# Patient Record
Sex: MALE
[De-identification: names, ages, dates, MRNs, and addresses within clinical notes are randomized per-mention and may not be internally consistent; named-entity substitution may affect disease eponyms.]

---

## 2022-08-10 ENCOUNTER — HOSPITAL ENCOUNTER (OUTPATIENT)
Dept: HOSPITAL 97 - ER | Age: 47
Setting detail: OBSERVATION
LOS: 1 days | Discharge: HOME | End: 2022-08-11
Attending: HOSPITALIST | Admitting: HOSPITALIST
Payer: COMMERCIAL

## 2022-08-10 VITALS — BODY MASS INDEX: 34.4 KG/M2

## 2022-08-10 DIAGNOSIS — E86.0: ICD-10-CM

## 2022-08-10 DIAGNOSIS — M19.90: ICD-10-CM

## 2022-08-10 DIAGNOSIS — R94.31: ICD-10-CM

## 2022-08-10 DIAGNOSIS — I95.1: Primary | ICD-10-CM
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
## 2022-08-10 DIAGNOSIS — N17.9: ICD-10-CM

## 2022-08-10 DIAGNOSIS — I10: ICD-10-CM

## 2022-08-10 DIAGNOSIS — Z82.49: ICD-10-CM

## 2022-08-10 DIAGNOSIS — Z79.899: ICD-10-CM

## 2022-08-10 DIAGNOSIS — Z20.822: ICD-10-CM

## 2022-08-10 LAB
ALBUMIN SERPL BCP-MCNC: 3.8 G/DL (ref 3.4–5)
ALP SERPL-CCNC: 90 U/L (ref 45–117)
ALT SERPL W P-5'-P-CCNC: 53 U/L (ref 12–78)
AST SERPL W P-5'-P-CCNC: 39 U/L (ref 15–37)
BUN BLD-MCNC: 23 MG/DL (ref 7–18)
GLUCOSE SERPLBLD-MCNC: 87 MG/DL (ref 74–106)
HCT VFR BLD CALC: 41.2 % (ref 39.6–49)
INR BLD: 0.97
LYMPHOCYTES # SPEC AUTO: 1.1 K/UL (ref 0.7–4.9)
MCV RBC: 84.7 FL (ref 80–100)
METHAMPHET UR QL SCN: NEGATIVE
PMV BLD: 7.6 FL (ref 7.6–11.3)
POTASSIUM SERPL-SCNC: 3.7 MMOL/L (ref 3.5–5.1)
RBC # BLD: 4.86 M/UL (ref 4.33–5.43)
THC SERPL-MCNC: NEGATIVE NG/ML
TROPONIN I SERPL HS-MCNC: 7.1 PG/ML (ref ?–58.9)

## 2022-08-10 PROCEDURE — 87811 SARS-COV-2 COVID19 W/OPTIC: CPT

## 2022-08-10 PROCEDURE — 85610 PROTHROMBIN TIME: CPT

## 2022-08-10 PROCEDURE — 84443 ASSAY THYROID STIM HORMONE: CPT

## 2022-08-10 PROCEDURE — 84484 ASSAY OF TROPONIN QUANT: CPT

## 2022-08-10 PROCEDURE — 80076 HEPATIC FUNCTION PANEL: CPT

## 2022-08-10 PROCEDURE — 93005 ELECTROCARDIOGRAM TRACING: CPT

## 2022-08-10 PROCEDURE — 80320 DRUG SCREEN QUANTALCOHOLS: CPT

## 2022-08-10 PROCEDURE — 85730 THROMBOPLASTIN TIME PARTIAL: CPT

## 2022-08-10 PROCEDURE — 81003 URINALYSIS AUTO W/O SCOPE: CPT

## 2022-08-10 PROCEDURE — 80329 ANALGESICS NON-OPIOID 1 OR 2: CPT

## 2022-08-10 PROCEDURE — 82550 ASSAY OF CK (CPK): CPT

## 2022-08-10 PROCEDURE — 85025 COMPLETE CBC W/AUTO DIFF WBC: CPT

## 2022-08-10 PROCEDURE — 84100 ASSAY OF PHOSPHORUS: CPT

## 2022-08-10 PROCEDURE — 80307 DRUG TEST PRSMV CHEM ANLYZR: CPT

## 2022-08-10 PROCEDURE — 93306 TTE W/DOPPLER COMPLETE: CPT

## 2022-08-10 PROCEDURE — 80061 LIPID PANEL: CPT

## 2022-08-10 PROCEDURE — 83735 ASSAY OF MAGNESIUM: CPT

## 2022-08-10 PROCEDURE — 36415 COLL VENOUS BLD VENIPUNCTURE: CPT

## 2022-08-10 PROCEDURE — 80048 BASIC METABOLIC PNL TOTAL CA: CPT

## 2022-08-10 RX ADMIN — Medication SCH ML: at 21:00

## 2022-08-10 NOTE — EDPHYS
Physician Documentation                                                                           

 [facility]                                                                 

Name: Juan Land                                                                              

Age: 47 yrs                                                                                       

Sex: Male                                                                                         

: 1975                                                                                   

MRN: [id number]                                                                                   

Arrival Date: 08/10/2022                                                                          

Time: 11:36                                                                                       

Account#: [id number]                                                                            

Bed 15                                                                                            

Private MD:                                                                                       

ED Physician Eamon Giraldo                                                                         

HPI:                                                                                              

08/10                                                                                             

11:45 This 47 yrs old  Male presents to ER via EMS with complaints of Heat Exposure.  cp  

11:45 The patient has experienced near-syncope, almost passed out, felt generally weak.       cp  

      Onset: The symptoms/episode began/occurred suddenly, just prior to arrival. Duration:       

      This was a single episode. Context: occurred outdoors, occurred while the patient was       

      working, Just prior to the episode the patient experienced no apparent symptoms.            

      Associated injury: The patient did not suffer any apparent associated injury.               

      Associated signs and symptoms: Pertinent negatives: abdominal pain, chest pain,             

      confusion, headache, palpitations, shortness of breath, vomiting. Current symptoms:         

      Currently, the patient is not experiencing any symptoms, the patient feels back to          

      baseline.                                                                                   

11:45 The patient has not experienced similar symptoms in the past. Patient reports he was    cp  

      outside working this morning for about 1 hour, when he suddenly felt lightheaded and        

      almost lost consciousness.                                                                  

                                                                                                  

Historical:                                                                                       

- Allergies:                                                                                      

11:50 No Known Allergies;                                                                     jg9 

- Home Meds:                                                                                      

11:50 phenazopyridine 200 mg Oral tab as needed [Active]; Toradol Oral 10 mg as needed        jg9 

      [Active];                                                                                   

- PMHx:                                                                                           

11:50 Arthritis;                                                                              jg9 

11:50 Kidney stones;                                                                          jg9 

                                                                                                  

- Immunization history:: Adult Immunizations not up to date.                                      

- Social history:: Smoking status: Patient denies any tobacco usage or history of.                

                                                                                                  

                                                                                                  

ROS:                                                                                              

11:50 Constitutional: Negative for body aches, chills, fever, poor PO intake.                 cp  

11:50 Eyes: Negative for injury, pain, redness, and discharge.                                cp  

11:50 ENT: Negative for drainage from ear(s), ear pain, sore throat, difficulty swallowing,       

      difficulty handling secretions.                                                             

11:50 Cardiovascular: Negative for chest pain, edema, palpitations.                               

11:50 Respiratory: Negative for cough, shortness of breath, wheezing.                             

11:50 Abdomen/GI: Negative for abdominal pain, nausea, vomiting, and diarrhea, constipation,      

      black/tarry stool, rectal bleeding.                                                         

11:50 Back: Negative for pain at rest, pain with movement.                                        

11:50 : Negative for urinary symptoms.                                                          

11:50 Neuro: Positive for near syncope, Negative for altered mental status, headache, loss of     

      consciousness, speech changes, syncope.                                                     

11:50 All other systems are negative.                                                             

                                                                                                  

Exam:                                                                                             

11:55 Constitutional: The patient appears in no acute distress, alert, awake,                 cp  

      non-diaphoretic, non-toxic, well developed, well nourished, overweight                      

11:55 Head/Face:  Normocephalic, atraumatic.                                                  cp  

11:55 Eyes: Periorbital structures: appear normal, Pupils: equal, round, and reactive to          

      light and accomodation, Extraocular movements: intact throughout, Conjunctiva: normal,      

      no exudate, no injection, Sclera: no appreciated abnormality, Lids and lashes: appear       

      normal, bilaterally.                                                                        

11:55 ENT: External ear(s): are unremarkable, Nose: is normal, Mouth: Lips: moist, Oral           

      mucosa: pink and intact, moist, Posterior pharynx: Airway: no evidence of obstruction,      

      patent, swelling, is not appreciated, erythema, is not appreciated, exudate, is not         

      appreciated.                                                                                

11:55 Neck: ROM/movement: is normal, is supple, without pain, no range of motions                 

      limitations, no nuchal rigidity.                                                            

11:55 Chest/axilla: Inspection: normal, Palpation: is normal, no crepitus, no tenderness.         

11:55 Cardiovascular: Rate: normal, Rhythm: regular, Heart sounds: murmur, not appreciated,       

      Edema: is not appreciated, JVD: is not appreciated.                                         

11:55 Respiratory: the patient does not display signs of respiratory distress,  Respirations:     

      normal, no use of accessory muscles, no retractions, labored breathing, is not present,     

      Breath sounds: are clear throughout, no decreased breath sounds, no stridor, no             

      wheezing.                                                                                   

11:55 Abdomen/GI: Inspection: abdomen appears normal, Bowel sounds: active, all quadrants,        

      Palpation: abdomen is soft and non-tender, in all quadrants.                                

11:55 Back: pain, is absent, ROM is normal.                                                       

11:55 Skin: cellulitis, is not appreciated, no rash present.                                      

11:55 Neuro: Orientation: to person, place \T\ time. Mentation: is normal, Cerebellar function:   

      Romberg testing is negative, Motor: moves all fours, strength is normal, Sensation: is      

      normal.                                                                                     

12:05 ECG was reviewed by the Attending Physician.                                            cp  

15:37 ECG was reviewed by the Attending Physician.                                            cp  

                                                                                                  

Vital Signs:                                                                                      

11:30  / 83; Pulse 75; Resp 16 S; Temp 97.7(A); Pulse Ox 100% on R/A; Weight 111.13 kg  j9 

      (R); Height 5 ft. 10 in. (177.80 cm) (R); Pain 0/10;                                        

12:00  / 76; Pulse 76; Resp 17; Pulse Ox 99% ;                                          jg9 

13:00  / 89; Pulse 66; Resp 14 S; Pulse Ox 100% on R/A; Pain 0/10;                      jg9 

14:00  / 88; Pulse 65; Resp 17 S; Pulse Ox 100% on R/A; Pain 0/10;                      jg9 

15:00  / 81; Pulse 71; Resp 15 S; Pulse Ox 100% on R/A;                                 jg9 

16:00  / 81; Pulse 65; Resp 15 S; Pulse Ox 100% on R/A;                                 jg9 

17:00  / 91; Pulse 68; Resp 14; Pulse Ox 95% on R/A;                                    jg9 

18:00  / 84; Pulse 68; Resp 13 S; Pulse Ox 100% ;                                       jg9 

19:00  / 77; Pulse 68; Resp 15; Pulse Ox 99% on R/A;                                    jg9 

11:30 Body Mass Index 35.15 (111.13 kg, 177.80 cm)                                            9 

                                                                                                  

MDM:                                                                                              

11:43 Patient medically screened.                                                             cp  

12:10 Differential Diagnosis: cardiac arrhythmia, cerebrovascular accident, GI bleed,         cp  

      idiopathic syncope, transient ischemic attack, vasovagal episode.                           

15:45 Physician consultation: Hai Simon MD was called at 15:45, regarding consult,        cp  

      patient's condition.                                                                        

16:15 Data reviewed: vital signs, nurses notes, lab test result(s), EKG, radiologic studies,  cp  

      plain films.                                                                                

16:15 Test interpretation: by ED physician or midlevel provider: ECG, plain radiologic        cp  

      studies. Counseling: I had a detailed discussion with the patient and/or guardian           

      regarding: the historical points, exam findings, and any diagnostic results supporting      

      the discharge/admit diagnosis, lab results, radiology results.                              

17:10 Physician consultation: Hai Simon MD was contacted at 17:07, regarding consult,     cp  

      patient's condition, recommends admission for observation and echo in morning.              

                                                                                                  

08/10                                                                                             

11:38 Order name: Acetaminophen; Complete Time: 13:27                                         cp  

08/10                                                                                             

11:38 Order name: Basic Metabolic Panel; Complete Time: 13:27                                 cp  

08/10                                                                                             

13:14 Interpretation: Normal except: BUN 23; CRE 1.50; GFR 57.                                cp  

08/10                                                                                             

11:38 Order name: CBC with Diff; Complete Time: 13:14                                         cp  

08/10                                                                                             

11:38 Order name: ETOH Level; Complete Time: 13:14                                            cp  

08/10                                                                                             

11:38 Order name: Hepatic Function; Complete Time: 13:27                                      cp  

08/10                                                                                             

13:27 Interpretation: Normal except: AST 39; GLOB 4.1; A/G 0.9.                               cp  

08/10                                                                                             

11:38 Order name: PT-INR; Complete Time: 13:14                                                cp  

08/10                                                                                             

11:38 Order name: Ptt, Activated; Complete Time: 13:14                                        cp  

08/10                                                                                             

11:38 Order name: Salicylate; Complete Time: 13:27                                            cp  

08/10                                                                                             

11:38 Order name: Urine Drug Screen; Complete Time: 13:27                                     cp  

08/10                                                                                             

11:38 Order name: CK; Complete Time: 13:27                                                    cp  

08/10                                                                                             

11:38 Order name: Troponin High Sensitivity; Complete Time: 13:27                             cp  

08/10                                                                                             

13:15 Interpretation: Reviewed.                                                                 

08/10                                                                                             

12:51 Order name: Urine Dipstick-Ancillary; Complete Time: 13:14                              EDMS

08/10                                                                                             

15:00 Order name: Troponin High Sensitivity: repeat \T\1530; Complete Time: 16:11               cp  

08/10                                                                                             

16:12 Interpretation: Reviewed.                                                               cp  

08/10                                                                                             

17:29 Order name: SARS RAPID; Complete Time: 19:07                                            cp  

08/10                                                                                             

11:38 Order name: EKG; Complete Time: 11:40                                                   cp  

08/10                                                                                             

11:38 Order name: EKG - Nurse/Tech; Complete Time: 12:17                                      cp  

08/10                                                                                             

11:38 Order name: IV Saline Lock; Complete Time: 12:17                                        cp  

08/10                                                                                             

11:38 Order name: Labs collected and sent; Complete Time: 12:17                               cp  

08/10                                                                                             

11:38 Order name: Urine Dipstick-Ancillary (obtain specimen); Complete Time: 13:14            cp  

08/10                                                                                             

15:00 Order name: EKG - Nurse/Tech: repeat \T\1530; Complete Time: 15:33                        cp  

08/10                                                                                             

17:16 Order name: Diet Heart Healthy; Complete Time: 17:17                                    cp  

                                                                                             

04:24 Order name: CBC with Automated Diff; Complete Time: 05:43                               EDMS

                                                                                             

04:34 Order name: Basic Metabolic Panel; Complete Time: 05:43                                 EDMS

                                                                                             

04:34 Order name: Phosphorus; Complete Time: 05:43                                            EDMS

                                                                                             

04:34 Order name: Creatine Phosphokinase; Complete Time: 05:43                                EDMS

                                                                                             

04:34 Order name: Lipid Profile; Complete Time: 05:43                                         EDMS

                                                                                             

04:34 Order name: Magnesium; Complete Time: 05:43                                             EDMS

                                                                                             

04:34 Order name: Thyroid Stimulating Hormone; Complete Time: 05:43                           EDMS

                                                                                                  

EC:05 Rate is 79 beats/min. Rhythm is regular. OK interval is normal. QRS interval is normal. cp  

      QT interval is normal. T waves are Inverted in leads III, aVF, aVR, V2, V3, V4, V5, V6.     

      Interpreted by me. Reviewed by me.                                                          

15:37 Rate is 65 beats/min. Rhythm is regular. OK interval is normal. QRS interval is normal. cp  

      QT interval is normal. T waves are Inverted in lead aVR. Interpreted by me. Reviewed by     

      me.                                                                                         

                                                                                                  

Administered Medications:                                                                         

12:17 Drug: NS 0.9% 1000 ml Route: IV; Rate: 1 bolus; Site: right antecubital;                jg9 

13:13 Follow up: IV Status: Completed infusion; IV Intake: 1000ml                             jg9 

                                                                                                  

                                                                                                  

Disposition Summary:                                                                              

08/10/22 18:09                                                                                    

Hospitalization Ordered                                                                           

      Hospitalization Status: Observation                                                     cp  

      Condition: Stable                                                                       cp  

      Problem: new                                                                            cp  

      Symptoms: have improved                                                                 cp  

      Bed/Room Type: Standard                                                                 cp  

      Provider: Buck Johnson(08/10/22 18:10)                                                cp  

      Location: [facility] ER HOLD(08/10/22 20:13)                                                  eb1 

      Room Assignment: ERHOLD-(08/10/22 20:13)                                                eb1 

      Diagnosis                                                                                   

        - Syncope Near                                                                        cp  

        - Abnormal electrocardiogram [ECG] [EKG]                                              cp  

      Forms:                                                                                      

        - Medication Reconciliation Form                                                      cp  

        - SBAR form                                                                           cp  

Signatures:                                                                                       

Dispatcher [employer]                           EDMS                                                 

Page, Donny, PA                         PA   cp                                                   

Gucci, Katalina, RN                     RN   eb1                                                  

Priscila Ferrari RN                   RN   jg9                                                  

Radha Lazo PA PA   sb3                                                  

                                                                                                  

Corrections: (The following items were deleted from the chart)                                    

11:45 11:38 Suicide Screening ([place]) ordered. cp                                          jg9 

18:10 18:09 Radha Lazo cp                                                                  cp  

20:13 18:09 Telemetry/MedSurg (observation)                                                 eb1 

20:13 18:09 cp                                                                                eb1 

                                                                                                  

**************************************************************************************************

## 2022-08-10 NOTE — P.HP
Certification for Inpatient


Patient admitted to: Observation


With expected LOS: <2 Midnights


Patient will require the following post-hospital care: None


Practitioner: I am a practitioner with admitting privileges, knowledge of 

patient current condition, hospital course, and medical plan of care.


Services: Services provided to patient in accordance with Admission requirements

found in Title 42 Section 412.3 of the Code of Federal Regulations





Patient History


Date of Service: 08/10/22


Reason for admission: Near Syncope, Abnormal EKG


History of Present Illness: 


Patient is a 47-year-old male with hypertension who presented to the ED with 

complaints of heat exhaustion.  Patient states that he was working outside when 

he started to profusely sweat, cramp, and almost passed out.  He reports not 

drinking enough water today.  Upon arrival to the ED, vital signs stable.  Labs 

significant for BUN 23, creatinine 1.5, WBC 15, . Initial EKG with inve

rted T waves. Repeat with NSR. Cardiology was consulted and wishes for patient 

to be admitted to hospitalist with echo in morning and will consult. He was 

given 1 liter of fluid and during my assessment, he reports feeling much better.

He is admitted for further observation.





Allergies





No Known Allergies Allergy (Unverified 05/15/17 06:24)


   





Home Medications: 








Losartan/Hydrochlorothiazide [Losartan-Hctz 50-12.5 mg Tab] 1 each PO DAILY 

08/10/22 








- Past Medical/Surgical History


Diabetic: No


-: Arthritis


-: Hypertension


Past Surgical History: Patient denies surgical history


Psychosocial/ Personal History: Patient works in manual labor.





- Social History


Smoking Status: Never smoker


Alcohol use: No


CD- Drugs: No


Caffeine use: Yes


Place of Residence: Home





Review of Systems


General: Sweats, Weakness, As per HPI





Physical Examination





- Physical Exam


General: Alert, In no apparent distress


HEENT: Atraumatic, PERRLA, EOMI, Sclerae nonicteric


Neck: Supple, JVD not distended


Respiratory: Clear to auscultation bilaterally, Normal air movement


Cardiovascular: Regular rate/rhythm, Normal S1 S2


Gastrointestinal: Normal bowel sounds, No tenderness


Musculoskeletal: No tenderness


Integumentary: No rashes


Neurological: Normal speech, Normal strength at 5/5 x4 extr, Normal tone, Normal

affect





- Studies


Laboratory Data (last 24 hrs)





08/10/22 12:10: PT 10.7, INR 0.97, APTT 20.2 L


08/10/22 12:10: WBC 15.8 H, Hgb 13.5 L, Hct 41.2, Plt Count 273


08/10/22 12:10: Sodium 136, Potassium 3.7, BUN 23 H, Creatinine 1.50 H, Glucose 

87, Total Bilirubin 0.6, AST 39 H, ALT 53, Alkaline Phosphatase 90








Assessment and Plan





- Problems (Diagnosis)


(1) Near syncope


Current Visit: Yes   Status: Acute   





(2) Abnormal EKG


Current Visit: Yes   Status: Acute   





(3) Elevated CPK


Current Visit: Yes   Status: Acute   





(4) CATHLEEN (acute kidney injury)


Current Visit: Yes   Status: Acute   





(5) Hypertension


Current Visit: Yes   Status: Chronic   


Qualifiers: 


   Hypertension type: primary hypertension   Qualified Code(s): I10 - Essential 

(primary) hypertension   





- Plan


-Continue IV hydration


-Echo ordered. Cardiology consulted. Monitor on telemetry


-Initial CPK slightly bumped. Recheck in morning after IVF.


-Lipid panel ordered. Patient has family history of heart disease


-Monitor and replete electrolytes per protocol


-Reconcile and continue home medications


-Lovenox for VTE ppx


-Full code


Discharge Plan: Home


Plan to discharge in: 24 Hours





- Advance Directives


Does patient have a Living Will: No


Does patient have a Durable POA for Healthcare: No





- Code Status/Comfort Care


Code Status Assessed: Yes (Full)


Critical Care: No


Time Spent Managing Pts Care (In Minutes): 50

## 2022-08-10 NOTE — ER
Nurse's Notes                                                                                     

 [facility]                                                                 

Name: Juan Land                                                                              

Age: 47 yrs                                                                                       

Sex: Male                                                                                         

: 1975                                                                                   

MRN: [id number]                                                                                   

Arrival Date: 08/10/2022                                                                          

Time: 11:36                                                                                       

Account#: [id number]                                                                            

Bed 15                                                                                            

Private MD:                                                                                       

Diagnosis: Syncope Near;Abnormal electrocardiogram [ECG] [EKG]                                    

                                                                                                  

Presentation:                                                                                     

08/10                                                                                             

11:30 Chief complaint: EMS states: Patient working in the heat started experiencing cramping  jg9 

      to back and shoulder, diaphoresis, lightheaded-patient remained in heat for 30 min          

      after onset of symptoms before moving inside and drenching himself in water in an           

      attempt to cool off. Coronavirus screen: Vaccine status: Patient reports being              

      unvaccinated. Ebola Screen: Patient negative for fever greater than or equal to 101.5       

      degrees Fahrenheit, and additional compatible Ebola Virus Disease symptoms Patient          

      denies exposure to infectious person. Patient denies travel to an Ebola-affected area       

      in the 21 days before illness onset. Initial Sepsis Screen: Does the patient meet any 2     

      criteria? No. Patient's initial sepsis screen is negative. Does the patient have a          

      suspected source of infection? No. Patient's initial sepsis screen is negative. Risk        

      Assessment: Do you want to hurt yourself or someone else? Patient reports no desire to      

      harm self or others. Onset of symptoms was August 10, 2022.                                 

11:30 Method Of Arrival: EMS: [facility]                                                       jg9 

11:30 Acuity: SHERI 3                                                                           jg9 

                                                                                                  

Triage Assessment:                                                                                

11:30 General: Appears comfortable, Behavior is calm, cooperative. Pain: Denies pain. Derm:   jg9 

      Skin is intact, Skin is moist, Skin is flushed, Skin temperature is warm.                   

                                                                                                  

Historical:                                                                                       

- Allergies:                                                                                      

11:50 No Known Allergies;                                                                     jg9 

- Home Meds:                                                                                      

11:50 phenazopyridine 200 mg Oral tab as needed [Active]; Toradol Oral 10 mg as needed        jg9 

      [Active];                                                                                   

- PMHx:                                                                                           

11:50 Arthritis;                                                                              jg9 

11:50 Kidney stones;                                                                          jg9 

                                                                                                  

- Immunization history:: Adult Immunizations not up to date.                                      

- Social history:: Smoking status: Patient denies any tobacco usage or history of.                

                                                                                                  

                                                                                                  

Screenin:51 Abuse screen: Denies threats or abuse. Denies injuries from another. Nutritional        jg9 

      screening: No deficits noted. Tuberculosis screening: No symptoms or risk factors           

      identified. Fall Risk None identified.                                                      

                                                                                                  

Assessment:                                                                                       

12:00 Reassessment: No changes from previously documented assessment.                         jg9 

13:00 Reassessment: Patient and/or family updated on plan of care and expected duration. Pain jg9 

      level reassessed. Patient is alert, oriented x 3, equal unlabored respirations, skin        

      warm/dry/pink. Patient states feeling better. Patient states symptoms have improved.        

14:00 Reassessment: No changes from previously documented assessment. Patient and/or family   jg9 

      updated on plan of care and expected duration. Pain level reassessed. Patient is alert,     

      oriented x 3, equal unlabored respirations, skin warm/dry/pink.                             

14:58 Reassessment: patient just finished eating, family at bedside advised this nurse that   jg9 

      the provider wanted to keep the patient for a couple hours in order to repeat the EKG;      

      the initial was questionable for normalcy according to family at bedside.                   

15:00 Reassessment: No changes from previously documented assessment. Patient and/or family   jg9 

      updated on plan of care and expected duration. Pain level reassessed. Patient is alert,     

      oriented x 3, equal unlabored respirations, skin warm/dry/pink.                             

16:00 Reassessment: Patient and/or family updated on plan of care and expected duration. Pain jg9 

      level reassessed. Patient is alert, oriented x 3, equal unlabored respirations, skin        

      warm/dry/pink.                                                                              

17:00 Reassessment: No changes from previously documented assessment. Patient and/or family   jg9 

      updated on plan of care and expected duration. Pain level reassessed. Patient is alert,     

      oriented x 3, equal unlabored respirations, skin warm/dry/pink.                             

18:00 Reassessment: No changes from previously documented assessment. Patient and/or family   jg9 

      updated on plan of care and expected duration. Pain level reassessed. Patient is alert,     

      oriented x 3, equal unlabored respirations, skin warm/dry/pink.                             

19:00 Reassessment: Patient and/or family updated on plan of care and expected duration. Pain jg9 

      level reassessed. Patient is alert, oriented x 3, equal unlabored respirations, skin        

      warm/dry/pink.                                                                              

                                                                                                  

Vital Signs:                                                                                      

11:30  / 83; Pulse 75; Resp 16 S; Temp 97.7(A); Pulse Ox 100% on R/A; Weight 111.13 kg  jg9 

      (R); Height 5 ft. 10 in. (177.80 cm) (R); Pain 0/10;                                        

12:00  / 76; Pulse 76; Resp 17; Pulse Ox 99% ;                                          jg9 

13:00  / 89; Pulse 66; Resp 14 S; Pulse Ox 100% on R/A; Pain 0/10;                      jg9 

14:00  / 88; Pulse 65; Resp 17 S; Pulse Ox 100% on R/A; Pain 0/10;                      jg9 

15:00  / 81; Pulse 71; Resp 15 S; Pulse Ox 100% on R/A;                                 jg9 

16:00  / 81; Pulse 65; Resp 15 S; Pulse Ox 100% on R/A;                                 jg9 

17:00  / 91; Pulse 68; Resp 14; Pulse Ox 95% on R/A;                                    jg9 

18:00  / 84; Pulse 68; Resp 13 S; Pulse Ox 100% ;                                       jg9 

19:00  / 77; Pulse 68; Resp 15; Pulse Ox 99% on R/A;                                    jg9 

11:30 Body Mass Index 35.15 (111.13 kg, 177.80 cm)                                            jg9 

                                                                                                  

ED Course:                                                                                        

11:30 Maintain EMS IV. Dressing intact. Good blood return noted. Site clean \T\ dry. Gauge \T\    jg
9

      site: 20 g right ac.                                                                        

11:36 Patient arrived in ED.                                                                  bd  

11:37 [facility]                                                                cp  

11:37 aEmon Giraldo MD is Attending Physician.                                                cp  

11:45 Priscila Ferrari, RN is Primary Nurse.                                                 jg9 

11:50 Triage completed.                                                                       jg9 

11:51 Arm band placed on right wrist.                                                         jg9 

11:51 Patient has correct armband on for positive identification. Bed in low position. Call   jg9 

      light in reach. Side rails up X 1.                                                          

15:44 Troponin High Sensitivity: repeat \T\1530 Sent.                                           jw7 

15:44 Lab(s) recollected, by me, sent to lab.                                                 jw7 

18:08 Buck Johnson is Hospitalizing Provider.                                               cp  

18:08 Radha Lazo PA is Hospitalizing Provider.                                            cp  

18:10 Buck Johnson is Hospitalizing Provider.                                               cp  

                                                                                             

07:00 No provider procedures requiring assistance completed. Patient admitted, IV remains in  jl7 

      place. intact, No redness/swelling at site.                                                 

07:53 Primary Nurse role handed off by Priscila Ferrari, ANDRES                                  jl7 

07:53 Jason Bustos, RN is Primary Nurse.                                                      jl7 

                                                                                                  

Administered Medications:                                                                         

08/10                                                                                             

12:17 Drug: NS 0.9% 1000 ml Route: IV; Rate: 1 bolus; Site: right antecubital;                jg9 

13:13 Follow up: IV Status: Completed infusion; IV Intake: 1000ml                             jg9 

                                                                                                  

                                                                                                  

Medication:                                                                                       

                                                                                             

10:20 VIS not applicable for this client.                                                     jl7 

                                                                                                  

Intake:                                                                                           

08/10                                                                                             

13:13 IV: 1000ml; Total: 1000ml.                                                              jg9 

                                                                                                  

Outcome:                                                                                          

18:09 Decision to Hospitalize by Provider.                                                      

                                                                                             

01:52 Admitted to ER Hold.  Please see [facility] for further documentation.                    ja 

07:00 Admitted to ER Hold.  Please see [facility] for further documentation.                    7 

07:00 Condition: stable                                                                           

10:23 Patient left the ED.                                                                    jl7 

                                                                                                  

Signatures:                                                                                       

Sarika Riggs Corey, PA PA   cp                                                   

Jason Bustos, RN                        ANDRES   jl7                                                  

Priscila Ferrari, ANDRES                   RN   jg9                                                  

Britany Collins Jeremy, RN                       RN   ja4                                                  

                                                                                                  

Corrections: (The following items were deleted from the chart)                                    

08/10                                                                                             

19:27 19:00  / 77; Pulse 68bpm; Resp 15bpm; Spontaneous; Pulse Ox 99%; jg9              jg9 

                                                                                                  

**************************************************************************************************

## 2022-08-11 VITALS — DIASTOLIC BLOOD PRESSURE: 77 MMHG | SYSTOLIC BLOOD PRESSURE: 129 MMHG | OXYGEN SATURATION: 99 %

## 2022-08-11 VITALS — TEMPERATURE: 97.7 F

## 2022-08-11 LAB
BUN BLD-MCNC: 17 MG/DL (ref 7–18)
GLUCOSE SERPLBLD-MCNC: 123 MG/DL (ref 74–106)
HCT VFR BLD CALC: 38.5 % (ref 39.6–49)
HDLC SERPL-MCNC: 59 MG/DL (ref 40–60)
LDLC SERPL CALC-MCNC: 97 MG/DL (ref ?–130)
LYMPHOCYTES # SPEC AUTO: 1.9 K/UL (ref 0.7–4.9)
MAGNESIUM SERPL-MCNC: 2 MG/DL (ref 1.8–2.4)
MCV RBC: 83.6 FL (ref 80–100)
PMV BLD: 7.1 FL (ref 7.6–11.3)
POTASSIUM SERPL-SCNC: 3.8 MMOL/L (ref 3.5–5.1)
RBC # BLD: 4.6 M/UL (ref 4.33–5.43)
TSH SERPL DL<=0.05 MIU/L-ACNC: 0.88 UIU/ML (ref 0.36–3.74)

## 2022-08-11 RX ADMIN — Medication SCH: at 09:00

## 2022-08-11 NOTE — EKG
Test Date:    2022-08-10               Test Time:    12:02:00

Technician:   ST                                     

                                                     

MEASUREMENT RESULTS:                                       

Intervals:                                           

Rate:         79                                     

MI:           132                                    

QRSD:         90                                     

QT:           384                                    

QTc:          440                                    

Axis:                                                

P:            49                                     

MI:           132                                    

QRS:          94                                     

T:            -21                                    

                                                     

INTERPRETIVE STATEMENTS:                                       

                                                     

Normal sinus rhythm

Rightward axis

T wave abnormality, consider inferior ischemia

Abnormal ECG

No previous ECG available for comparison



Electronically Signed On 08-11-22 14:31:46 CDT by Renny Romero

## 2022-08-11 NOTE — CON
Admitted to Dr. Johnson on 08/10/2022 for presyncope.



History Of Present Illness:  The patient is a 47-year-old, history of hypertension, has what appears 
to be a heat stroke while he was working outside.  He got dehydrated, dizzy, sweaty, came to the [facility] room.  He had an EKG that was slightly abnormal with a CPK of 800 and negative troponin.  Creat
inine was 1.5, it is normal now.  He takes losartan and hydrochlorothiazide, which may have exacerbat
ed the heat stroke.  This is not an acute coronary syndrome.



Past Medical History:  Hypertension.



Allergies:  NONE.



Review of Systems:

Negative.



Social History:  Negative.



Family History:  Negative.



Medications:  Listed earlier.



Physical Examination:

That was done by emergency room staff was perfectly normal without any arrhythmias.



Diagnostic Data:  As stated earlier.



Impression And Plan:  Heat exhaustion, orthostatic hypotension causing his presyncope, elevated creat
inine and CPK secondary to dehydration and maybe very mild rhabdomyolysis.  Needs to be hydrated.  

Echocardiogram is pending.  He can go home today and I will see him in the office as an outpatient if
 his symptoms reoccur.





NB/MARVIN

DD:  08/11/2022 09:23:02Voice ID:  [id number]

DT:  08/11/2022 12:42:24Report ID:  [id number]

## 2022-08-11 NOTE — P.DS
Admission Date: 08/10/22


Discharge Date: 08/11/22


Disposition: ROUTINE DISCHARGE


Discharge Condition: FAIR


Reason for Admission: Near Syncope, Abnormal EKG





- Problems


(1) CATHLEEN (acute kidney injury)


Status: Acute   





(2) Abnormal EKG


Status: Acute   





(3) Elevated CPK


Status: Acute   





(4) Near syncope


Status: Acute   





(5) Hypertension


Status: Chronic   


Qualifiers: 


   Hypertension type: primary hypertension   Qualified Code(s): I10 - Essential 

(primary) hypertension   


Brief History of Present Illness: 


Patient is a 47-year-old male with hypertension who presented to the ED with 

complaints of heat exhaustion.  Patient states that he was working outside when 

he started to profusely sweat, cramp, and almost passed out.  He reported not 

drinking enough water.  Upon arrival to the ED, vital signs stable.  Labs 

significant for BUN 23, creatinine 1.5, WBC 15, . Initial EKG with 

inverted T waves. Repeat with NSR. Cardiology was consulted and who recommended 

patient to be admitted to hospitalist with echo in morning and for him to 

consult. He was given 1 liter of fluid.  Patient placed under observation for 

further management.





Hospital Course: 


Troponin trended negative.  Patient was evaluated by cardiology-Dr. Simon 

recommended no further cardiac intervention.  Patient had CATHLEEN which resolved 

with IV hydration.  He is currently asymptomatic with stable blood pressure.  

Patient is deemed stable for discharge.





Vital Signs/Physical Exam: 














Temp Pulse Resp BP Pulse Ox


 


    72   17   130/67   100 


 


    08/11/22 05:41  08/11/22 05:41  08/11/22 05:41  08/11/22 05:41








General: Alert, In no apparent distress, Oriented x3


HEENT: Mucous membr. moist/pink


Neck: Supple, JVD not distended


Respiratory: Clear to auscultation bilaterally, Normal air movement


Cardiovascular: Regular rate/rhythm, Normal S1 S2


Gastrointestinal: Soft and benign, Non-distended


Musculoskeletal: No swelling


Integumentary: No rashes


Laboratory Data at Discharge: 














WBC  9.4 K/uL (4.3-10.9)  D 08/11/22  03:32    


 


Hgb  13.0 g/dL (13.6-17.9)  L  08/11/22  03:32    


 


Hct  38.5 % (39.6-49.0)  L  08/11/22  03:32    


 


Plt Count  291 K/uL (152-406)   08/11/22  03:32    


 


PT  10.7 SECONDS (9.5-12.5)   08/10/22  12:10    


 


INR  0.97   08/10/22  12:10    


 


APTT  20.2 SECONDS (24.3-36.9)  L  08/10/22  12:10    


 


Sodium  137 mmol/L (136-145)   08/11/22  03:32    


 


Potassium  3.8 mmol/L (3.5-5.1)   08/11/22  03:32    


 


BUN  17 mg/dL (7-18)   08/11/22  03:32    


 


Creatinine  1.01 mg/dL (0.55-1.3)   08/11/22  03:32    


 


Glucose  123 mg/dL ()  H  08/11/22  03:32    


 


Phosphorus  2.7 mg/dL (2.5-4.9)   08/11/22  03:32    


 


Magnesium  2.0 mg/dL (1.8-2.4)   08/11/22  03:32    


 


Total Bilirubin  0.6 mg/dL (0.2-1.0)   08/10/22  12:10    


 


AST  39 U/L (15-37)  H  08/10/22  12:10    


 


ALT  53 U/L (12-78)   08/10/22  12:10    


 


Alkaline Phosphatase  90 U/L ()   08/10/22  12:10    


 


Triglycerides  92 mg/dL (<150)   08/11/22  03:32    


 


Cholesterol  174 mg/dL (<200)   08/11/22  03:32    


 


HDL Cholesterol  59 mg/dL (40-60)   08/11/22  03:32    


 


Cholesterol/HDL Ratio  2.95   08/11/22  03:32    








Home Medications: 








Losartan/Hydrochlorothiazide [Losartan-Hctz 50-12.5 mg Tab] 1 each PO DAILY 

08/10/22 





Diet: AHA


Activity: Ad barbara

## 2022-08-12 NOTE — ECHO
HEIGHT: 5 ft 10 in   WEIGHT: 245 lb 0 oz   DATE OF STUDY: 08/11/2016   REFER DR: Radha Lazo

2-DIMENSIONAL: YES

     M.MODE: YES

 DOPPLER: YES

COLOR FLOW: YES



                    TDS:  YES

PORTABLE: 

 DEFINITY:  

BUBBLE STUDY: 





DIAGNOSIS:  ABNORMAL ELECTROCARDIOGRAM



CARDIAC HISTORY:  

CATHERIZATION: 

SURGERY: 

PROSTHETIC VALVE: 

PACEMAKER: 





MEASUREMENTS (cm)

    DIASTOLIC (NORMALS)                 SYSTOLIC (NORMALS)

IVSd                 1.1 (0.6-1.2)                    LA Diam 3.3 (1.9-4.0)     LVEF       
  55-60%  

LVIDd               4.1 (3.5-5.7)                        LVIDs      2.2 (2.0-3.5)     %FS  
        7%

LVPWd             1.2 (0.6-1.2)

Ao Diam           2.4 (2.0-3.7)



2 DIMENSIONAL ASSESSMENT:

RIGHT ATRIUM:                   NORMAL

LEFT ATRIUM:       NORMAL



RIGHT VENTRICLE:            NORMAL

LEFT VENTRICLE: NORMAL



TRICUSPID VALVE:             NORMAL

MITRAL VALVE:     NORMAL



PULMONIC VALVE:             NORMAL

AORTIC VALVE:     NORMAL



PERICARDIAL EFFUSION: NONE

AORTIC ROOT:      NORMAL





LEFT VENTRICULAR WALL MOTION:     NORMAL



DOPPLER/COLOR FLOW:     NORMAL



COMMENTS:      POOR WINDOWS.  LEFT VENTRICULAR EJECTION FRACTION IS NORMAL 55-60%.  

                            NORMAL WALL MOTION.



TECHNOLOGIST:   JOHNY ENNIS